# Patient Record
Sex: MALE | Race: WHITE | NOT HISPANIC OR LATINO
[De-identification: names, ages, dates, MRNs, and addresses within clinical notes are randomized per-mention and may not be internally consistent; named-entity substitution may affect disease eponyms.]

---

## 2017-05-19 ENCOUNTER — OFFICE VISIT - HEALTHEAST (OUTPATIENT)
Dept: RADIATION ONCOLOGY | Age: 57
End: 2017-05-19

## 2017-05-19 ENCOUNTER — AMBULATORY - HEALTHEAST (OUTPATIENT)
Dept: LAB | Facility: CLINIC | Age: 57
End: 2017-05-19

## 2017-05-19 DIAGNOSIS — C61 PROSTATE CANCER (H): ICD-10-CM

## 2017-05-19 LAB — PSA SERPL-MCNC: 0.6 NG/ML (ref 0–3.5)

## 2018-07-09 ENCOUNTER — COMMUNICATION - HEALTHEAST (OUTPATIENT)
Dept: RADIATION ONCOLOGY | Facility: HOSPITAL | Age: 58
End: 2018-07-09

## 2021-05-31 VITALS — WEIGHT: 180.3 LBS | BODY MASS INDEX: 26.24 KG/M2

## 2021-06-25 NOTE — PROGRESS NOTES
Progress Notes by Luciana Bhatt MD at 5/19/2017 10:30 AM     Author: Luciana Bhatt MD Service: -- Author Type: Physician    Filed: 5/19/2017 11:18 AM Encounter Date: 5/19/2017 Status: Signed    : Luciana Bhatt MD (Physician)         [facility] Radiation Oncology Follow Up Note    Patient: Chong Huber  MRN: [id number]  Date of Service: 05/19/2017    Assessment / Impression     1. Prostate cancer              Body site: Male Pelvis    Plan:     Prostate cancer: Chong is now 27 months status post stereotactic body radiation therapy for his prostate cancer.  His PSA has decreased by 50% from 1.2 last year to 0.6 this year.  He has no significant symptoms related to treatment.  Follow-up in 1 year with repeat PSA.    Face to face time  15 minutes with > 50% spent on consultation, education and coordination of care.    Subjective:      HPI: Chong Huber is a 56 y.o. male with a Wolf 7 pattern 3+4 adenocarcinoma of the prostate was 11 out of 12 cores positive for tumor and a pretreatment PSA of 12.2, who was treated with Cyberknife radiosurgery. The following is a summary of treatment information.       Course: C1    Treatment Site   Energy   Dose/Fx (cGy)   #Fx   Total Dose (cGy)   Start Date   End Date   Elapsed Days     Prostate   6X   700   5 / 5   3,500   2/5/2015 2/11/2015   6     Total:         3,500   2/5/2015 2/11/2015   6         Chief Complaint   Patient presents with   ? HE Cancer   ? Prostate Cancer   .    Current Outpatient Prescriptions   Medication Sig Dispense Refill   ? AMOXICILLIN ORAL Take by mouth. Takes prior to dental appts for mitral valve prolapse     ? diazepam (VALIUM) 5 MG tablet Take 5 mg by mouth daily. Takes 4 days/week for anxiety with public speaking     ? metoprolol (TOPROL-XL) 100 MG 24 hr tablet Take 100 mg by mouth daily. Takes 4 days/week for anxiety with public speaking       No current facility-administered medications for this visit.         The following portions of the patient's history were reviewed and updated as appropriate: allergies, current medications, past family history, past medical history, past social history, past surgical history and problem list.           Objective:     Physical Exam    Vitals:    05/19/17 1035   BP: 114/77   Pulse: 70   Temp: 97.9  F (36.6  C)   TempSrc: Oral   SpO2: 98%   Weight: 180 lb 4.8 oz (81.8 kg)        General appearance: alert, appears stated age and cooperative  Head: Normocephalic, without obvious abnormality, atraumatic  Eyes: negative findings: conjunctivae and sclerae normal and corneas clear  Male genitalia: normal  Rectal: Normal rectal tone, no palpable masses.  Concave prostate bed, no blood on the examining finger.  Skin: Skin color, texture, turgor normal. No rashes or lesions    Recent Labs:   Recent Results (from the past 168 hour(s))   PSA (Prostatic-Specific Antigen), Diagnostic   Result Value Ref Range    PSA 0.6 0.0 - 3.5 ng/mL          Imaging: Imaging results 30 days: No results found.    Signed by: Luciana Bhatt MD